# Patient Record
Sex: FEMALE | Race: BLACK OR AFRICAN AMERICAN | NOT HISPANIC OR LATINO | Employment: FULL TIME | URBAN - METROPOLITAN AREA
[De-identification: names, ages, dates, MRNs, and addresses within clinical notes are randomized per-mention and may not be internally consistent; named-entity substitution may affect disease eponyms.]

---

## 2024-04-09 ENCOUNTER — OFFICE VISIT (OUTPATIENT)
Dept: NEUROLOGY | Facility: CLINIC | Age: 34
End: 2024-04-09
Payer: COMMERCIAL

## 2024-04-09 VITALS
BODY MASS INDEX: 36.37 KG/M2 | HEIGHT: 64 IN | SYSTOLIC BLOOD PRESSURE: 142 MMHG | OXYGEN SATURATION: 98 % | HEART RATE: 64 BPM | WEIGHT: 213 LBS | DIASTOLIC BLOOD PRESSURE: 92 MMHG

## 2024-04-09 DIAGNOSIS — G43.719 INTRACTABLE CHRONIC MIGRAINE WITHOUT AURA AND WITHOUT STATUS MIGRAINOSUS: Primary | ICD-10-CM

## 2024-04-09 DIAGNOSIS — G40.909 SEIZURE DISORDER: ICD-10-CM

## 2024-04-09 PROBLEM — F44.5 PSYCHOGENIC NONEPILEPTIC SEIZURE: Status: ACTIVE | Noted: 2020-01-15

## 2024-04-09 PROBLEM — K21.9 GERD (GASTROESOPHAGEAL REFLUX DISEASE): Status: ACTIVE | Noted: 2020-07-31

## 2024-04-09 PROBLEM — F32.A ANXIETY AND DEPRESSION: Status: ACTIVE | Noted: 2020-01-15

## 2024-04-09 PROBLEM — G43.009 MIGRAINE WITHOUT AURA AND WITHOUT STATUS MIGRAINOSUS, NOT INTRACTABLE: Status: ACTIVE | Noted: 2020-01-15

## 2024-04-09 PROBLEM — I10 ESSENTIAL (PRIMARY) HYPERTENSION: Status: ACTIVE | Noted: 2023-08-31

## 2024-04-09 PROBLEM — F41.9 ANXIETY AND DEPRESSION: Status: ACTIVE | Noted: 2020-01-15

## 2024-04-09 PROBLEM — E28.2 PCOS (POLYCYSTIC OVARIAN SYNDROME): Status: ACTIVE | Noted: 2020-04-29

## 2024-04-09 PROBLEM — E11.9 TYPE 2 DIABETES MELLITUS WITHOUT COMPLICATIONS: Status: ACTIVE | Noted: 2021-05-17

## 2024-04-09 PROCEDURE — 99204 OFFICE O/P NEW MOD 45 MIN: CPT | Performed by: NURSE PRACTITIONER

## 2024-04-09 RX ORDER — BUSPIRONE HYDROCHLORIDE 7.5 MG/1
7.5 TABLET ORAL 3 TIMES DAILY
COMMUNITY
Start: 2024-01-23

## 2024-04-09 RX ORDER — GLIMEPIRIDE 4 MG/1
4 TABLET ORAL
COMMUNITY
Start: 2024-01-23

## 2024-04-09 RX ORDER — BLOOD-GLUCOSE METER
KIT MISCELLANEOUS
COMMUNITY
Start: 2024-03-04

## 2024-04-09 RX ORDER — ESCITALOPRAM OXALATE 20 MG/1
20 TABLET ORAL DAILY
COMMUNITY
Start: 2024-01-23

## 2024-04-09 RX ORDER — SEMAGLUTIDE 0.68 MG/ML
INJECTION, SOLUTION SUBCUTANEOUS WEEKLY
COMMUNITY
Start: 2024-03-31

## 2024-04-09 RX ORDER — LANCETS 33 GAUGE
EACH MISCELLANEOUS
COMMUNITY
Start: 2024-03-04

## 2024-04-09 RX ORDER — HYDROCHLOROTHIAZIDE 12.5 MG/1
12.5 TABLET ORAL DAILY
COMMUNITY
Start: 2024-01-23

## 2024-04-09 RX ORDER — RIMEGEPANT SULFATE 75 MG/75MG
75 TABLET, ORALLY DISINTEGRATING ORAL AS NEEDED
COMMUNITY
Start: 2024-03-24

## 2024-04-09 RX ORDER — AMLODIPINE BESYLATE 5 MG/1
5 TABLET ORAL DAILY
COMMUNITY
Start: 2024-03-24

## 2024-04-09 RX ORDER — RIZATRIPTAN BENZOATE 10 MG/1
10 TABLET ORAL ONCE AS NEEDED
COMMUNITY
Start: 2024-02-18

## 2024-04-09 RX ORDER — PROPRANOLOL HYDROCHLORIDE 20 MG/1
20 TABLET ORAL 2 TIMES DAILY
COMMUNITY
Start: 2024-03-24

## 2024-04-09 NOTE — PROGRESS NOTES
Neuro Office Visit      Encounter Date: 2024   Patient Name: Pankaj Silverman  : 1990   MRN: [id number]   PCP: Mya CHAMORRO  Chief Complaint:    Chief Complaint   Patient presents with    Migraine       History of Present Illness: Pankaj Silverman is a 34 y.o. female who is here today in Neurology for migraine.    Migraine      Headache Symptoms:   Days per month: Initially had 4-5 migraines a month 12-15 HA days. Now with 2 migraines a month with 4-6 headache days a month, will have a mild headache 8/month  Location: Right Eye, Left Eye, and Frontal       Quality: Throbbing        Duration: 2-3  Severity: 7/10. No missed days this year.  Triggers: weather, stress  Associated Symptoms: Nausea, Vomiting, Photophobia, Phonophobia, Scent sensitivity , and  Vision changes floaters are bright  Aura: none  Eye exam: Aug nml  Hydration: not enough 16 oz   Sleep: 2 shifts a week on nights  Birth control: hysterectomy    Abortives: maxalt-effective but has HTN. Triptans contraindicated.  Preventives: TPM, propranolol, Nurtec, Trokendi, Aimovig-not covered.  Has constipation and taking Ozempic-avoid injectable.       Onset as a child. Previously seen by Dr Collett at [facility].       MRI brain 2019-neg at [facility]     Seizure Disorder  No meds. No records available for review.  No records of EEG.                                                                    PMH: T2DM, migraine, anxiety, depression, non-motor epileptic seizure,HTN  PSH: Hysterectomy for AUB.  FH: mom, GM, sister with migraine  SH:-tob, +etoh, -drug. Trauma tech at  on nightshift  Subjective      Past Medical History:   Past Medical History:   Diagnosis Date    Anxiety     Depression     Hypertension     Non-motor epileptic seizure     Type 2 diabetes mellitus        Past Surgical History:   Past Surgical History:   Procedure Laterality Date    DILATATION AND CURETTAGE      HX OVARIAN CYSTECTOMY Right  2014    HYSTERECTOMY  2022       Family History:   Family History   Problem Relation Age of Onset    Migraines Mother     Diabetes Mother     Hypertension Mother     Lupus Mother     Rheum arthritis Mother     Heart attack Father     Migraines Maternal Grandmother     Diabetes Maternal Grandmother        Social History:   Social History     Socioeconomic History    Marital status: Single   Tobacco Use    Smoking status: Never    Smokeless tobacco: Never   Vaping Use    Vaping status: Former    Quit date: 9/9/2024    Substances: THC    Devices: Disposable   Substance and Sexual Activity    Alcohol use: Yes     Comment: Socially    Drug use: Never    Sexual activity: Defer       Medications:     Current Outpatient Medications:     amLODIPine (NORVASC) 5 MG tablet, Take 1 tablet by mouth Daily., Disp: , Rfl:     Blood Glucose Monitoring Suppl (OneTouch Verio Reflect) w/Device kit, , Disp: , Rfl:     busPIRone (BUSPAR) 7.5 MG tablet, Take 1 tablet by mouth 3 (Three) Times a Day. Taking BID, Disp: , Rfl:     escitalopram (LEXAPRO) 20 MG tablet, Take 1 tablet by mouth Daily., Disp: , Rfl:     glimepiride (AMARYL) 4 MG tablet, Take 1 tablet by mouth Every Morning Before Breakfast., Disp: , Rfl:     glucose blood test strip, See Admin Instructions., Disp: , Rfl:     hydroCHLOROthiazide 12.5 MG tablet, Take 1 tablet by mouth Daily., Disp: , Rfl:     Lancets (OneTouch Delica Plus [id number]) misc, , Disp: , Rfl:     Nurtec 75 MG dispersible tablet, Take 1 tablet by mouth As Needed., Disp: , Rfl:     propranolol (INDERAL) 20 MG tablet, Take 1 tablet by mouth 2 (Two) Times a Day., Disp: , Rfl:     rizatriptan (MAXALT) 10 MG tablet, Take 1 tablet by mouth 1 (One) Time As Needed., Disp: , Rfl:     Ozempic, 0.25 or 0.5 MG/DOSE, 2 MG/3ML solution pen-injector, Inject  under the skin into the appropriate area as directed 1 (One) Time Per Week. (Patient not taking: Reported on 4/9/2024), Disp: , Rfl:     terconazole (TERAZOL 3) 0.8 %  vaginal cream, Insert 1 applicator into the vagina Every Night. (Patient not taking: Reported on 4/9/2024), Disp: , Rfl:     Allergies:   Allergies   Allergen Reactions    Metformin Other (See Comments)     GI upset    Other reaction(s): Other - please document in the comment field   GI upset       PHQ-9 Total Score:     MELVIN Fall Risk Assessment has not been completed.    Objective     Physical Exam:   Physical Exam  Neurological:      Mental Status: She is oriented to person, place, and time.      Coordination: Finger-Nose-Finger Test, Heel to Shin Test and Romberg Test normal.      Gait: Gait is intact.      Deep Tendon Reflexes:      Reflex Scores:       Tricep reflexes are 2+ on the right side and 2+ on the left side.       Bicep reflexes are 2+ on the right side and 2+ on the left side.       Brachioradialis reflexes are 2+ on the right side and 2+ on the left side.       Patellar reflexes are 2+ on the right side and 2+ on the left side.       Achilles reflexes are 2+ on the right side and 2+ on the left side.  Psychiatric:         Speech: Speech normal.         Neurologic Exam     Mental Status   Oriented to person, place, and time.   Follows 3 step commands.   Attention: normal. Concentration: normal.   Speech: speech is normal   Level of consciousness: alert  Knowledge: consistent with education.   Normal comprehension.     Cranial Nerves     CN III, IV, VI   CN III: no CN III palsy  CN VI: no CN VI palsy  Nystagmus: none   Diplopia: none  Upgaze: normal  Downgaze: normal  Conjugate gaze: present    CN VII   Facial expression full, symmetric.     CN VIII   Hearing: intact    CN XII   CN XII normal.     Motor Exam   Muscle bulk: normal  Overall muscle tone: normal    Strength   Right biceps: 5/5  Left biceps: 5/5  Right triceps: 5/5  Left triceps: 5/5  Right interossei: 5/5  Left interossei: 5/5  Right quadriceps: 5/5  Left quadriceps: 5/5  Right anterior tibial: 5/5  Left anterior tibial: 5/5  Right  "posterior tibial: 5/5  Left posterior tibial: 5/5    Sensory Exam   Light touch normal.     Gait, Coordination, and Reflexes     Gait  Gait: normal    Coordination   Romberg: negative  Finger to nose coordination: normal  Heel to shin coordination: normal    Tremor   Resting tremor: absent  Action tremor: absent    Reflexes   Right brachioradialis: 2+  Left brachioradialis: 2+  Right biceps: 2+  Left biceps: 2+  Right triceps: 2+  Left triceps: 2+  Right patellar: 2+  Left patellar: 2+  Right achilles: 2+  Left achilles: 2+  Right : 2+  Left : 2+       Vital Signs:   Vitals:    04/09/24 0855   BP: 142/92   Pulse: 64   SpO2: 98%   Weight: 96.6 kg (213 lb)   Height: 162.6 cm (64\")     Body mass index is 36.56 kg/m².         Assessment / Plan      Assessment/Plan:   Diagnoses and all orders for this visit:    1. Intractable chronic migraine without aura and without status migrainosus (Primary)  Comments:  Sent message to pharmacy to PA Qulipta. Use nurtec as abortive. Cont propranolol.  Pt to increase hydration.    2. Seizure disorder  Comments:  Data deficient. No meds. No EEG.  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 Discussed risk versus benefits of treatment plan with patient and/or family-including medications, labs and radiology that may be ordered. Addressed questions and concerns during visit. Patient and/or family verbalized understanding and agree with plan. Instructed to call the office with any questions and report to ER with any life-threatening symptoms.     Follow Up:   Return in about 4 months (around 8/9/2024) for Recheck.    During this visit the following were done:  Labs Reviewed [x]    Labs Ordered []    Radiology Reports Reviewed []    Radiology Ordered []    PCP Records Reviewed []    Referring Provider Records Reviewed []    ER Records Reviewed []    Hospital Records Reviewed []    History " Obtained From Family []    Radiology Images Reviewed []    Other Reviewed []    Records Requested []      Erin Jean, DNP, APRN

## 2024-04-09 NOTE — LETTER
April 10, 2024     ASHTYN Petersen  [address] [facility] [address]    Patient: Pankaj Silverman   YOB: 1990   Date of Visit: 2024     Dear ASHTYN Petersen:       Thank you for referring Pankaj Silverman to me for evaluation. Below are the relevant portions of my assessment and plan of care.    If you have questions, please do not hesitate to call me. I look forward to following Pankaj along with you.         Sincerely,        Erin Jean DNP, APRN        CC: No Recipients    Erin Jean DNP, APRN  [id number] [phone]  Signed     Neuro Office Visit      Encounter Date: 2024   Patient Name: Pankaj Silverman  : 1990   MRN: [id number]   PCP: Mya CHAMORRO  Chief Complaint:    Chief Complaint   Patient presents with   • Migraine       History of Present Illness: Pankaj Silverman is a 34 y.o. female who is here today in Neurology for migraine.    Migraine      Headache Symptoms:   Days per month: Initially had 4-5 migraines a month 12-15 HA days. Now with 2 migraines a month with 4-6 headache days a month, will have a mild headache 8/month  Location: Right Eye, Left Eye, and Frontal       Quality: Throbbing        Duration: 2-3  Severity: 7/10. No missed days this year.  Triggers: weather, stress  Associated Symptoms: Nausea, Vomiting, Photophobia, Phonophobia, Scent sensitivity , and  Vision changes floaters are bright  Aura: none  Eye exam: Aug nml  Hydration: not enough 16 oz   Sleep: 2 shifts a week on nights  Birth control: hysterectomy    Abortives: maxalt-effective but has HTN. Triptans contraindicated.  Preventives: TPM, propranolol, Nurtec, Trokendi, Aimovig-not covered.  Has constipation and taking Ozempic-avoid injectable.       Onset as a child. Previously seen by Dr Collett at [facility]  [facility].       MRI brain 2019-neg at [facility]     Seizure Disorder  No meds. No records available for review.  No records of EEG.                                                                     PMH: T2DM, migraine, anxiety, depression, non-motor epileptic seizure,HTN  PSH: Hysterectomy for AUB.  FH: mom, GM, sister with migraine  SH:-tob, +etoh, -drug. 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  Subjective      Past Medical History:   Past Medical History:   Diagnosis Date   • Anxiety    • Depression    • Hypertension    • Non-motor epileptic seizure    • Type 2 diabetes mellitus        Past Surgical History:   Past Surgical History:   Procedure Laterality Date   • DILATATION AND CURETTAGE  2019   • HX OVARIAN CYSTECTOMY Right 2014   • HYSTERECTOMY  2022       Family History:   Family History   Problem Relation Age of Onset   • Migraines Mother    • Diabetes Mother    • Hypertension Mother    • Lupus Mother    • Rheum arthritis Mother    • Heart attack Father    • Migraines Maternal Grandmother    • Diabetes Maternal Grandmother        Social History:   Social History     Socioeconomic History   • Marital status: Single   Tobacco Use   • Smoking status: Never   • Smokeless tobacco: Never   Vaping Use   • Vaping status: Former   • Quit date: 9/9/2024   • Substances: THC   • Devices: Disposable   Substance and Sexual Activity   • Alcohol use: Yes     Comment: Socially   • Drug use: Never   • Sexual activity: Defer       Medications:     Current Outpatient Medications:   •  amLODIPine (NORVASC) 5 MG tablet, Take 1 tablet by mouth Daily., Disp: , Rfl:   •  Blood Glucose Monitoring Suppl (OneTouch Verio Reflect) w/Device kit, , Disp: , Rfl:   •  busPIRone (BUSPAR) 7.5 MG tablet, Take 1 tablet by mouth 3 (Three) Times a Day. Taking BID, Disp: , Rfl:   •  escitalopram (LEXAPRO) 20 MG tablet, Take 1 tablet by mouth Daily., Disp: , Rfl:   •  glimepiride (AMARYL) 4 MG tablet, Take 1 tablet by mouth Every Morning Before Breakfast., Disp: , Rfl:   •  glucose blood test strip, See Admin Instructions., Disp: , Rfl:   •  hydroCHLOROthiazide 12.5 MG tablet, Take 1 tablet  by mouth Daily., Disp: , Rfl:   •  Lancets (OneTouch Delica Plus [id number]) misc, , Disp: , Rfl:   •  Nurtec 75 MG dispersible tablet, Take 1 tablet by mouth As Needed., Disp: , Rfl:   •  propranolol (INDERAL) 20 MG tablet, Take 1 tablet by mouth 2 (Two) Times a Day., Disp: , Rfl:   •  rizatriptan (MAXALT) 10 MG tablet, Take 1 tablet by mouth 1 (One) Time As Needed., Disp: , Rfl:   •  Ozempic, 0.25 or 0.5 MG/DOSE, 2 MG/3ML solution pen-injector, Inject  under the skin into the appropriate area as directed 1 (One) Time Per Week. (Patient not taking: Reported on 4/9/2024), Disp: , Rfl:   •  terconazole (TERAZOL 3) 0.8 % vaginal cream, Insert 1 applicator into the vagina Every Night. (Patient not taking: Reported on 4/9/2024), Disp: , Rfl:     Allergies:   Allergies   Allergen Reactions   • Metformin Other (See Comments)     GI upset    Other reaction(s): Other - please document in the comment field   GI upset       PHQ-9 Total Score:     STEADI Fall Risk Assessment has not been completed.    Objective     Physical Exam:   Physical Exam  Neurological:      Mental Status: She is oriented to person, place, and time.      Coordination: Finger-Nose-Finger Test, Heel to Shin Test and Romberg Test normal.      Gait: Gait is intact.      Deep Tendon Reflexes:      Reflex Scores:       Tricep reflexes are 2+ on the right side and 2+ on the left side.       Bicep reflexes are 2+ on the right side and 2+ on the left side.       Brachioradialis reflexes are 2+ on the right side and 2+ on the left side.       Patellar reflexes are 2+ on the right side and 2+ on the left side.       Achilles reflexes are 2+ on the right side and 2+ on the left side.  Psychiatric:         Speech: Speech normal.         Neurologic Exam     Mental Status   Oriented to person, place, and time.   Follows 3 step commands.   Attention: normal. Concentration: normal.   Speech: speech is normal   Level of consciousness: alert  Knowledge: consistent with  "education.   Normal comprehension.     Cranial Nerves     CN III, IV, VI   CN III: no CN III palsy  CN VI: no CN VI palsy  Nystagmus: none   Diplopia: none  Upgaze: normal  Downgaze: normal  Conjugate gaze: present    CN VII   Facial expression full, symmetric.     CN VIII   Hearing: intact    CN XII   CN XII normal.     Motor Exam   Muscle bulk: normal  Overall muscle tone: normal    Strength   Right biceps: 5/5  Left biceps: 5/5  Right triceps: 5/5  Left triceps: 5/5  Right interossei: 5/5  Left interossei: 5/5  Right quadriceps: 5/5  Left quadriceps: 5/5  Right anterior tibial: 5/5  Left anterior tibial: 5/5  Right posterior tibial: 5/5  Left posterior tibial: 5/5    Sensory Exam   Light touch normal.     Gait, Coordination, and Reflexes     Gait  Gait: normal    Coordination   Romberg: negative  Finger to nose coordination: normal  Heel to shin coordination: normal    Tremor   Resting tremor: absent  Action tremor: absent    Reflexes   Right brachioradialis: 2+  Left brachioradialis: 2+  Right biceps: 2+  Left biceps: 2+  Right triceps: 2+  Left triceps: 2+  Right patellar: 2+  Left patellar: 2+  Right achilles: 2+  Left achilles: 2+  Right : 2+  Left : 2+       Vital Signs:   Vitals:    04/09/24 0855   BP: 142/92   Pulse: 64   SpO2: 98%   Weight: 96.6 kg (213 lb)   Height: 162.6 cm (64\")     Body mass index is 36.56 kg/m².         Assessment / Plan      Assessment/Plan:   Diagnoses and all orders for this visit:    1. Intractable chronic migraine without aura and without status migrainosus (Primary)  Comments:  Sent message to pharmacy to PA Qulipta. Use nurtec as abortive. Cont propranolol.  Pt to increase hydration.    2. Seizure disorder  Comments:  Data deficient. No meds. No EEG.  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 Discussed risk versus benefits of treatment plan with patient and/or " family-including medications, labs and radiology that may be ordered. Addressed questions and concerns during visit. Patient and/or family verbalized understanding and agree with plan. Instructed to call the office with any questions and report to ER with any life-threatening symptoms.     Follow Up:   Return in about 4 months (around 8/9/2024) for Recheck.    During this visit the following were done:  Labs Reviewed [x]    Labs Ordered []    Radiology Reports Reviewed []    Radiology Ordered []    PCP Records Reviewed []    Referring Provider Records Reviewed []    ER Records Reviewed []    Hospital Records Reviewed []    History Obtained From Family []    Radiology Images Reviewed []    Other Reviewed []    Records Requested []      Erin Jean, DNP, APRN

## 2024-04-11 ENCOUNTER — SPECIALTY PHARMACY (OUTPATIENT)
Dept: ONCOLOGY | Facility: HOSPITAL | Age: 34
End: 2024-04-11
Payer: COMMERCIAL

## 2024-04-11 RX ORDER — RIMEGEPANT SULFATE 75 MG/75MG
75 TABLET, ORALLY DISINTEGRATING ORAL DAILY PRN
Qty: 16 TABLET | Refills: 11 | Status: SHIPPED | OUTPATIENT
Start: 2024-04-11

## 2024-04-11 RX ORDER — ATOGEPANT 60 MG/1
60 TABLET ORAL DAILY
Qty: 30 TABLET | Refills: 11 | Status: SHIPPED | OUTPATIENT
Start: 2024-04-11

## 2024-08-12 ENCOUNTER — OFFICE VISIT (OUTPATIENT)
Dept: NEUROLOGY | Facility: CLINIC | Age: 34
End: 2024-08-12
Payer: COMMERCIAL

## 2024-08-12 VITALS
DIASTOLIC BLOOD PRESSURE: 84 MMHG | HEART RATE: 84 BPM | SYSTOLIC BLOOD PRESSURE: 136 MMHG | BODY MASS INDEX: 36.43 KG/M2 | OXYGEN SATURATION: 99 % | WEIGHT: 213.4 LBS | HEIGHT: 64 IN

## 2024-08-12 DIAGNOSIS — G43.719 INTRACTABLE CHRONIC MIGRAINE WITHOUT AURA AND WITHOUT STATUS MIGRAINOSUS: Primary | ICD-10-CM

## 2024-08-12 PROCEDURE — 99213 OFFICE O/P EST LOW 20 MIN: CPT | Performed by: NURSE PRACTITIONER

## 2024-08-12 RX ORDER — ATOGEPANT 60 MG/1
60 TABLET ORAL DAILY
Qty: 30 TABLET | Refills: 11 | Status: SHIPPED | OUTPATIENT
Start: 2024-08-12

## 2024-08-12 RX ORDER — DICLOFENAC SODIUM 75 MG/1
75 TABLET, DELAYED RELEASE ORAL
COMMUNITY
Start: 2024-06-15

## 2024-08-12 RX ORDER — RIMEGEPANT SULFATE 75 MG/75MG
75 TABLET, ORALLY DISINTEGRATING ORAL DAILY PRN
Qty: 16 TABLET | Refills: 11 | Status: SHIPPED | OUTPATIENT
Start: 2024-08-12

## 2024-08-12 RX ORDER — FLURBIPROFEN SODIUM 0.3 MG/ML
SOLUTION/ DROPS OPHTHALMIC
COMMUNITY
Start: 2024-07-12

## 2024-08-12 RX ORDER — INSULIN GLARGINE 100 [IU]/ML
INJECTION, SOLUTION SUBCUTANEOUS
COMMUNITY
Start: 2024-07-10 | End: 2024-08-12

## 2024-08-12 RX ORDER — TIRZEPATIDE 2.5 MG/.5ML
2.5 INJECTION, SOLUTION SUBCUTANEOUS WEEKLY
COMMUNITY
Start: 2024-08-06

## 2024-08-12 RX ORDER — DOXYCYCLINE 100 MG/1
100 TABLET ORAL DAILY
COMMUNITY
Start: 2024-06-17 | End: 2024-08-12

## 2024-08-12 NOTE — LETTER
2024     ASHTYN Petersen  [address] [facility] [address]    Patient: Pankaj Silverman   YOB: 1990   Date of Visit: 2024     Dear ASHTYN Petersen:       Thank you for referring Pankaj Silverman to me for evaluation. Below are the relevant portions of my assessment and plan of care.    If you have questions, please do not hesitate to call me. I look forward to following Pankaj along with you.         Sincerely,        Erin Jean DNP, APRN        CC: No Recipients    Erin Jean DNP, APRN  [id number] [phone]  Signed     Neuro Office Visit      Encounter Date: 2024   Patient Name: Pankaj Silverman  : 1990   MRN: [id number]   PCP: ASHTYN Petersen  Chief Complaint:    Chief Complaint   Patient presents with   • Migraine       History of Present Illness: Pankaj Silverman is a 34 y.o. female who is here today in Neurology for  migraine and seizure like activity      Last visit 2024 w me-qulipta, nurtec as abortive, cont propranolol, increase hydration.  History of Present Illness  The patient presents for evaluation of headaches.    Since her last visit, she has experienced only 3 migraines. She is currently on Qulipta without any side effects, which she procures from the pharmacy. Her medication regimen also includes Nurtec, which she typically uses, and propranolol. She once took rizatriptan as Nurtec was ineffective. She has increased her water intake, which has reduced her daily headaches. She denies any dizziness, double vision, or loss of vision. However, she does report some blurry vision, for which she has an eye appointment scheduled. She denies any falls, numbness, tingling, or difficulty swallowing.     Migraine        Headache Symptoms:   Days per month: Initially had 4-5 migraines a month 12-15 HA days. Now with 2 migraines a month with 4-6 headache days a month, will have a mild headache 8/month  Location: Right  Eye, Left Eye, and Frontal       Quality: Throbbing        Duration: 2-3  Severity: 7/10. No missed days this year.  Triggers: weather, stress  Associated Symptoms: Nausea, Vomiting, Photophobia, Phonophobia, Scent sensitivity , and  Vision changes floaters are bright  Aura: none  Eye exam: Aug nml  Hydration: not enough 16 oz   Sleep: 2 shifts a week on nights  Birth control: hysterectomy     Abortives: maxalt-effective but has HTN. Triptans contraindicated.  Preventives: TPM, propranolol, Nurtec, Trokendi, Aimovig-not covered.  Has constipation and taking Ozempic-avoid injectable.       Onset as a child. Previously seen by Dr Collett at [facility].        MRI brain 5/12/2019-neg at [facility]      Seizure Disorder  No meds. No records available for review.  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  FH: mom, GM, sister with migraine  SH:-tob, +etoh, -drug. Trauma tech at  on nightshift    Subjective      Past Medical History:   Past Medical History:   Diagnosis Date   • Anxiety    • Cluster headache    • Depression    • Headache, tension-type    • Hypertension    • Migraine    • Non-motor epileptic seizure    • Type 2 diabetes mellitus        Past Surgical History:   Past Surgical History:   Procedure Laterality Date   • DILATATION AND CURETTAGE  2019   • HX OVARIAN CYSTECTOMY Right 2014   • HYSTERECTOMY  2022       Family History:   Family History   Problem Relation Age of Onset   • Migraines Mother    • Diabetes Mother    • Hypertension Mother    • Lupus Mother    • Rheum arthritis Mother    • Heart attack Father    • Migraines Maternal Grandmother    • Diabetes Maternal Grandmother        Social History:   Social History     Socioeconomic History   • Marital status: Single   Tobacco Use   • Smoking status: Never   • Smokeless  tobacco: Never   Vaping Use   • Vaping status: Former   • Quit date: 9/9/2024   • Substances: THC   • Devices: Disposable   Substance and Sexual Activity   • Alcohol use: Yes     Alcohol/week: 3.0 standard drinks of alcohol     Types: 3 Glasses of wine per week     Comment: Socially   • Drug use: Never   • Sexual activity: Yes     Partners: Male     Birth control/protection: Hysterectomy       Medications:     Current Outpatient Medications:   •  amLODIPine (NORVASC) 5 MG tablet, Take 1 tablet by mouth Daily., Disp: , Rfl:   •  Atogepant (Qulipta) 60 MG tablet, Take 1 tablet by mouth Daily., Disp: 30 tablet, Rfl: 11  •  B-D UF III MINI PEN NEEDLES 31G X 5 MM misc, , Disp: , Rfl:   •  Blood Glucose Monitoring Suppl (OneTouch Verio Reflect) w/Device kit, , Disp: , Rfl:   •  busPIRone (BUSPAR) 7.5 MG tablet, Take 1 tablet by mouth 3 (Three) Times a Day. Taking BID, Disp: , Rfl:   •  diclofenac (VOLTAREN) 75 MG EC tablet, Take 1 tablet by mouth., Disp: , Rfl:   •  escitalopram (LEXAPRO) 20 MG tablet, Take 1 tablet by mouth Daily., Disp: , Rfl:   •  glimepiride (AMARYL) 4 MG tablet, Take 1 tablet by mouth Every Morning Before Breakfast., Disp: , Rfl:   •  glucose blood test strip, See Admin Instructions., Disp: , Rfl:   •  hydroCHLOROthiazide 12.5 MG tablet, Take 1 tablet by mouth Daily., Disp: , Rfl:   •  Lancets (OneTouch Delica Plus [id number]) misc, , Disp: , Rfl:   •  Mounjaro 2.5 MG/0.5ML solution pen-injector pen, 0.5 mL 1 (One) Time Per Week., Disp: , Rfl:   •  propranolol (INDERAL) 20 MG tablet, Take 1 tablet by mouth 2 (Two) Times a Day., Disp: , Rfl:   •  Rimegepant Sulfate (Nurtec) 75 MG tablet dispersible tablet, Take 1 tablet by mouth Daily As Needed (at onset of headache.). Take 1 tablet at the onset of headache, Max of 75 mg/24 hours, Max of 18 tabs/30 days., Disp: 16 tablet, Rfl: 11    Allergies:   Allergies   Allergen Reactions   • Metformin Other (See Comments)     GI upset    Other reaction(s): Other -  please document in the comment field   GI upset       PHQ-9 Total Score:     [facility].    Objective     Physical Exam:   Physical Exam  Neurological:      Mental Status: She is oriented to person, place, and time.      Gait: Gait is intact.      Deep Tendon Reflexes:      Reflex Scores:       Tricep reflexes are 2+ on the right side and 2+ on the left side.       Bicep reflexes are 2+ on the right side and 2+ on the left side.       Brachioradialis reflexes are 2+ on the right side and 2+ on the left side.       Patellar reflexes are 2+ on the right side and 2+ on the left side.       Achilles reflexes are 2+ on the right side and 2+ on the left side.  Psychiatric:         Speech: Speech normal.         Neurologic Exam     Mental Status   Oriented to person, place, and time.   Follows 3 step commands.   Attention: normal. Concentration: normal.   Speech: speech is normal   Level of consciousness: alert  Knowledge: consistent with education.   Normal comprehension.     Cranial Nerves     CN III, IV, VI   Right pupil: Accommodation: intact.   Left pupil: Accommodation: intact.   CN III: no CN III palsy  CN VI: no CN VI palsy  Nystagmus: none   Diplopia: none  Upgaze: normal  Downgaze: normal  Conjugate gaze: present    CN VII   Facial expression full, symmetric.     CN VIII   Hearing: intact    CN XII   CN XII normal.     Motor Exam   Muscle bulk: normal  Overall muscle tone: normal    Strength   Right biceps: 5/5  Left biceps: 5/5  Right triceps: 5/5  Left triceps: 5/5  Right interossei: 5/5  Left interossei: 5/5  Right quadriceps: 5/5  Left quadriceps: 5/5  Right anterior tibial: 5/5  Left anterior tibial: 5/5  Right posterior tibial: 5/5  Left posterior tibial: 5/5    Sensory Exam   Light touch normal.     Gait, Coordination, and Reflexes     Gait  Gait: normal    Tremor   Resting tremor: absent  Action tremor: absent    Reflexes   Right brachioradialis: 2+  Left  "brachioradialis: 2+  Right biceps: 2+  Left biceps: 2+  Right triceps: 2+  Left triceps: 2+  Right patellar: 2+  Left patellar: 2+  Right achilles: 2+  Left achilles: 2+  Right : 2+  Left : 2+     Physical Exam  Neurologic reflexes are good.      Vital Signs:   Vitals:    08/12/24 1444   BP: 136/84   Pulse: 84   SpO2: 99%   Weight: 96.8 kg (213 lb 6.4 oz)   Height: 162.6 cm (64.02\")     Body mass index is 36.61 kg/m².         Assessment / Plan      Assessment/Plan:   Diagnoses and all orders for this visit:    1. Intractable chronic migraine without aura and without status migrainosus (Primary)  -     Atogepant (Qulipta) 60 MG tablet; Take 1 tablet by mouth Daily.  Dispense: 30 tablet; Refill: 11  -     Rimegepant Sulfate (Nurtec) 75 MG tablet dispersible tablet; Take 1 tablet by mouth Daily As Needed (at onset of headache.). Take 1 tablet at the onset of headache, Max of 75 mg/24 hours, Max of 18 tabs/30 days.  Dispense: 16 tablet; Refill: 11       Assessment & Plan  1. Migraine headaches.  A prescription for Qulipta and Nurtec has been refilled. She has been advised to maintain adequate hydration. Should she experience headaches again, she is to inform me.    Follow-up  A follow-up visit is scheduled for 1 year from now.        Patient Education:       Reviewed medications, potential side effects and signs and symptoms to report. Discussed risk versus benefits of treatment plan with patient and/or family-including medications, labs and radiology that may be ordered. Addressed questions and concerns during visit. Patient and/or family verbalized understanding and agree with plan. Instructed to call the office with any questions and report to ER with any life-threatening symptoms.     Follow Up:   Return in about 1 year (around 8/12/2025) for Recheck.    During this visit the following were done:  Labs Reviewed []    Labs Ordered []    Radiology Reports Reviewed []    Radiology Ordered []    PCP Records " Reviewed []    Referring Provider Records Reviewed []    ER Records Reviewed []    Hospital Records Reviewed []    History Obtained From Family []    Radiology Images Reviewed []    Other Reviewed [x]    Records Requested []      Patient or patient representative verbalized consent for the use of Ambient Listening during the visit with  Erin Jean DNP, APRLAMAR for chart documentation. 8/12/2024  12:46 EDT      Erin Jean DNP, APRN

## 2024-08-12 NOTE — PROGRESS NOTES
Neuro Office Visit      Encounter Date: 2024   Patient Name: Pankaj Silverman  : 1990   MRN: [id number]   PCP: ASHTYN Petersen  Chief Complaint:    Chief Complaint   Patient presents with    Migraine       History of Present Illness: Pankaj Silverman is a 34 y.o. female who is here today in Neurology for  migraine and seizure like activity      Last visit 2024 w me-qulipta, nurtec as abortive, cont propranolol, increase hydration.  History of Present Illness  The patient presents for evaluation of headaches.    Since her last visit, she has experienced only 3 migraines. She is currently on Qulipta without any side effects, which she procures from the pharmacy. Her medication regimen also includes Nurtec, which she typically uses, and propranolol. She once took rizatriptan as Nurtec was ineffective. She has increased her water intake, which has reduced her daily headaches. She denies any dizziness, double vision, or loss of vision. However, she does report some blurry vision, for which she has an eye appointment scheduled. She denies any falls, numbness, tingling, or difficulty swallowing.     Migraine        Headache Symptoms:   Days per month: Initially had 4-5 migraines a month 12-15 HA days. Now with 2 migraines a month with 4-6 headache days a month, will have a mild headache 8/month  Location: Right Eye, Left Eye, and Frontal       Quality: Throbbing        Duration: 2-3  Severity: 7/10. No missed days this year.  Triggers: weather, stress  Associated Symptoms: Nausea, Vomiting, Photophobia, Phonophobia, Scent sensitivity , and  Vision changes floaters are bright  Aura: none  Eye exam: Aug nml  Hydration: not enough 16 oz   Sleep: 2 shifts a week on nights  Birth control: hysterectomy     Abortives: maxalt-effective but has HTN. Triptans contraindicated.  Preventives: TPM, propranolol, Nurtec, Trokendi, Aimovig-not covered.  Has constipation and taking Ozempic-avoid injectable.        Onset as a child. Previously seen by Dr Collett at [facility].        MRI brain 5/12/2019-neg at [facility]      Seizure Disorder  No meds. No records available for review.  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  FH: mom, GM, sister with migraine  SH:-tob, +etoh, -drug. Trauma tech at  on nightshift    Subjective      Past Medical History:   Past Medical History:   Diagnosis Date    Anxiety     Cluster headache     Depression     Headache, tension-type     Hypertension     Migraine     Non-motor epileptic seizure     Type 2 diabetes mellitus        Past Surgical History:   Past Surgical History:   Procedure Laterality Date    DILATATION AND CURETTAGE  2019    HX OVARIAN CYSTECTOMY Right 2014    HYSTERECTOMY  2022       Family History:   Family History   Problem Relation Age of Onset    Migraines Mother     Diabetes Mother     Hypertension Mother     Lupus Mother     Rheum arthritis Mother     Heart attack Father     Migraines Maternal Grandmother     Diabetes Maternal Grandmother        Social History:   Social History     Socioeconomic History    Marital status: Single   Tobacco Use    Smoking status: Never    Smokeless tobacco: Never   Vaping Use    Vaping status: Former    Quit date: 9/9/2024    Substances: THC    Devices: Disposable   Substance and Sexual Activity    Alcohol use: Yes     Alcohol/week: 3.0 standard drinks of alcohol     Types: 3 Glasses of wine per week     Comment: Socially    Drug use: Never    Sexual activity: Yes     Partners: Male     Birth control/protection: Hysterectomy       Medications:     Current Outpatient Medications:     amLODIPine (NORVASC) 5 MG tablet, Take 1 tablet by mouth Daily., Disp: , Rfl:     Atogepant (Qulipta) 60 MG tablet, Take 1 tablet by mouth Daily., Disp: 30 tablet, Rfl: 11     B-D UF III MINI PEN NEEDLES 31G X 5 MM misc, , Disp: , Rfl:     Blood Glucose Monitoring Suppl (OneTouch Verio Reflect) w/Device kit, , Disp: , Rfl:     busPIRone (BUSPAR) 7.5 MG tablet, Take 1 tablet by mouth 3 (Three) Times a Day. Taking BID, Disp: , Rfl:     diclofenac (VOLTAREN) 75 MG EC tablet, Take 1 tablet by mouth., Disp: , Rfl:     escitalopram (LEXAPRO) 20 MG tablet, Take 1 tablet by mouth Daily., Disp: , Rfl:     glimepiride (AMARYL) 4 MG tablet, Take 1 tablet by mouth Every Morning Before Breakfast., Disp: , Rfl:     glucose blood test strip, See Admin Instructions., Disp: , Rfl:     hydroCHLOROthiazide 12.5 MG tablet, Take 1 tablet by mouth Daily., Disp: , Rfl:     Lancets (OneTouch Delica Plus [id number]) misc, , Disp: , Rfl:     Mounjaro 2.5 MG/0.5ML solution pen-injector pen, 0.5 mL 1 (One) Time Per Week., Disp: , Rfl:     propranolol (INDERAL) 20 MG tablet, Take 1 tablet by mouth 2 (Two) Times a Day., Disp: , Rfl:     Rimegepant Sulfate (Nurtec) 75 MG tablet dispersible tablet, Take 1 tablet by mouth Daily As Needed (at onset of headache.). Take 1 tablet at the onset of headache, Max of 75 mg/24 hours, Max of 18 tabs/30 days., Disp: 16 tablet, Rfl: 11    Allergies:   Allergies   Allergen Reactions    Metformin Other (See Comments)     GI upset    Other reaction(s): Other - please document in the comment field   GI upset       PHQ-9 Total Score:     [facility] Fall Risk Assessment has not been completed.    Objective     Physical Exam:   Physical Exam  Neurological:      Mental Status: She is oriented to person, place, and time.      Gait: Gait is intact.      Deep Tendon Reflexes:      Reflex Scores:       Tricep reflexes are 2+ on the right side and 2+ on the left side.       Bicep reflexes are 2+ on the right side and 2+ on the left side.       Brachioradialis reflexes are 2+ on the right side and 2+ on the left side.       Patellar reflexes are 2+ on the right side and 2+ on the left side.        "Achilles reflexes are 2+ on the right side and 2+ on the left side.  Psychiatric:         Speech: Speech normal.         Neurologic Exam     Mental Status   Oriented to person, place, and time.   Follows 3 step commands.   Attention: normal. Concentration: normal.   Speech: speech is normal   Level of consciousness: alert  Knowledge: consistent with education.   Normal comprehension.     Cranial Nerves     CN III, IV, VI   Right pupil: Accommodation: intact.   Left pupil: Accommodation: intact.   CN III: no CN III palsy  CN VI: no CN VI palsy  Nystagmus: none   Diplopia: none  Upgaze: normal  Downgaze: normal  Conjugate gaze: present    CN VII   Facial expression full, symmetric.     CN VIII   Hearing: intact    CN XII   CN XII normal.     Motor Exam   Muscle bulk: normal  Overall muscle tone: normal    Strength   Right biceps: 5/5  Left biceps: 5/5  Right triceps: 5/5  Left triceps: 5/5  Right interossei: 5/5  Left interossei: 5/5  Right quadriceps: 5/5  Left quadriceps: 5/5  Right anterior tibial: 5/5  Left anterior tibial: 5/5  Right posterior tibial: 5/5  Left posterior tibial: 5/5    Sensory Exam   Light touch normal.     Gait, Coordination, and Reflexes     Gait  Gait: normal    Tremor   Resting tremor: absent  Action tremor: absent    Reflexes   Right brachioradialis: 2+  Left brachioradialis: 2+  Right biceps: 2+  Left biceps: 2+  Right triceps: 2+  Left triceps: 2+  Right patellar: 2+  Left patellar: 2+  Right achilles: 2+  Left achilles: 2+  Right : 2+  Left : 2+     Physical Exam  Neurologic reflexes are good.      Vital Signs:   Vitals:    08/12/24 1444   BP: 136/84   Pulse: 84   SpO2: 99%   Weight: 96.8 kg (213 lb 6.4 oz)   Height: 162.6 cm (64.02\")     Body mass index is 36.61 kg/m².         Assessment / Plan      Assessment/Plan:   Diagnoses and all orders for this visit:    1. Intractable chronic migraine without aura and without status migrainosus (Primary)  -     Atogepant (Qulipta) 60 MG " tablet; Take 1 tablet by mouth Daily.  Dispense: 30 tablet; Refill: 11  -     Rimegepant Sulfate (Nurtec) 75 MG tablet dispersible tablet; Take 1 tablet by mouth Daily As Needed (at onset of headache.). Take 1 tablet at the onset of headache, Max of 75 mg/24 hours, Max of 18 tabs/30 days.  Dispense: 16 tablet; Refill: 11       Assessment & Plan  1. Migraine headaches.  A prescription for Qulipta and Nurtec has been refilled. She has been advised to maintain adequate hydration. Should she experience headaches again, she is to inform me.    Follow-up  A follow-up visit is scheduled for 1 year from now.        Patient Education:       Reviewed medications, potential side effects and signs and symptoms to report. Discussed risk versus benefits of treatment plan with patient and/or family-including medications, labs and radiology that may be ordered. Addressed questions and concerns during visit. Patient and/or family verbalized understanding and agree with plan. Instructed to call the office with any questions and report to ER with any life-threatening symptoms.     Follow Up:   Return in about 1 year (around 8/12/2025) for Recheck.    During this visit the following were done:  Labs Reviewed []    Labs Ordered []    Radiology Reports Reviewed []    Radiology Ordered []    PCP Records Reviewed []    Referring Provider Records Reviewed []    ER Records Reviewed []    Hospital Records Reviewed []    History Obtained From Family []    Radiology Images Reviewed []    Other Reviewed [x]    Records Requested []      Patient or patient representative verbalized consent for the use of Ambient Listening during the visit with  Erin Jean DNP, ASHTYN for chart documentation. 8/12/2024  12:46 EDT      Erin Jean DNP, APRN

## 2024-09-19 PROCEDURE — 87798 DETECT AGENT NOS DNA AMP: CPT

## 2024-09-19 PROCEDURE — 87661 TRICHOMONAS VAGINALIS AMPLIF: CPT

## 2024-09-19 PROCEDURE — 87529 HSV DNA AMP PROBE: CPT

## 2024-09-19 PROCEDURE — 87491 CHLMYD TRACH DNA AMP PROBE: CPT

## 2024-09-19 PROCEDURE — 87591 N.GONORRHOEAE DNA AMP PROB: CPT

## 2024-09-19 PROCEDURE — 87801 DETECT AGNT MULT DNA AMPLI: CPT

## 2024-10-04 ENCOUNTER — TELEPHONE (OUTPATIENT)
Dept: NEUROLOGY | Facility: CLINIC | Age: 34
End: 2024-10-04
Payer: COMMERCIAL

## 2024-10-04 DIAGNOSIS — G43.719 INTRACTABLE CHRONIC MIGRAINE WITHOUT AURA AND WITHOUT STATUS MIGRAINOSUS: Primary | ICD-10-CM

## 2024-10-04 RX ORDER — BUTALBITAL, ACETAMINOPHEN AND CAFFEINE 50; 325; 40 MG/1; MG/1; MG/1
1 TABLET ORAL EVERY 4 HOURS PRN
Qty: 15 TABLET | Refills: 0 | Status: SHIPPED | OUTPATIENT
Start: 2024-10-04

## 2024-10-04 NOTE — TELEPHONE ENCOUNTER
I will send in a few Fioricet as an emergency prescription only. If no relief she should got to ER.

## 2024-10-04 NOTE — TELEPHONE ENCOUNTER
Caller: Pankaj Silverman     Relationship: SELF    Best call back number: [phone]     What is your medical concern? MIGRAINE    How long has this issue been going on? SINCE MONDAY    Is your provider already aware of this issue? YES    Have you been treated for this issue? PT HAS TAKEN HER NURTEC AND IT TAKES THE MIGRAINE TO A TOLERABLE HA BUT THE NEXT DAY THE MIGRAINE COMES BACK FULL FORCE.     PATIENT ASKS IF THERE IS ANYTHING THAT CAN BE DONE TO GET RELIEF.     [facility] - [place], [place] - [address] [place] [address]. - [phone] [facility] [phone] FX      PT ALSO ASKS IF SHE CAN HAVE A WORK EXCUSE FOR TODAY AND TOMORROW DUE TO THE MIGRAINE. SHE SAID IT CAN BE SENT TO HER VIA [employer]

## 2025-04-10 ENCOUNTER — SPECIALTY PHARMACY (OUTPATIENT)
Dept: ONCOLOGY | Facility: HOSPITAL | Age: 35
End: 2025-04-10
Payer: COMMERCIAL